# Patient Record
Sex: FEMALE | Race: WHITE | Employment: PART TIME | URBAN - METROPOLITAN AREA
[De-identification: names, ages, dates, MRNs, and addresses within clinical notes are randomized per-mention and may not be internally consistent; named-entity substitution may affect disease eponyms.]

---

## 2017-06-09 ENCOUNTER — HOSPITAL ENCOUNTER (OUTPATIENT)
Dept: MRI IMAGING | Age: 37
Discharge: HOME | End: 2017-06-09
Attending: FAMILY MEDICINE
Payer: SELF-PAY

## 2017-06-09 DIAGNOSIS — M54.50 LOW BACK PAIN RADIATING TO RIGHT LEG: ICD-10-CM

## 2017-06-09 DIAGNOSIS — M79.604 LOW BACK PAIN RADIATING TO RIGHT LEG: ICD-10-CM

## 2017-06-09 PROCEDURE — 72148 MRI LUMBAR SPINE W/O DYE: CPT

## 2017-06-11 NOTE — PROGRESS NOTES
Disc extrusion with right nerve impingement on MRI  Recommend referral to neurosurgery  Meme Georges MD

## 2017-06-16 NOTE — PROGRESS NOTES
PATIENT NOTIFIED OF RESULTS AND VERBALIZES UNDERSTANDING; patient in agreement to have referral to neurosurgeon. States she is having extreme pain and asking what she can take.

## 2017-07-25 PROBLEM — M51.26 HNP (HERNIATED NUCLEUS PULPOSUS), LUMBAR: Status: ACTIVE | Noted: 2017-07-25

## 2017-07-25 PROBLEM — M54.16 LUMBAR RADICULOPATHY: Status: ACTIVE | Noted: 2017-07-25
